# Patient Record
Sex: MALE | Race: WHITE
[De-identification: names, ages, dates, MRNs, and addresses within clinical notes are randomized per-mention and may not be internally consistent; named-entity substitution may affect disease eponyms.]

---

## 2018-02-11 ENCOUNTER — HOSPITAL ENCOUNTER (EMERGENCY)
Dept: HOSPITAL 25 - UCCORT | Age: 63
Discharge: HOME | End: 2018-02-11
Payer: COMMERCIAL

## 2018-02-11 VITALS — SYSTOLIC BLOOD PRESSURE: 119 MMHG | DIASTOLIC BLOOD PRESSURE: 70 MMHG
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
## 2018-02-11 DIAGNOSIS — S20.221A: Primary | ICD-10-CM
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
## 2018-02-11 PROCEDURE — G0463 HOSPITAL OUTPT CLINIC VISIT: HCPCS

## 2018-02-11 PROCEDURE — 99211 OFF/OP EST MAY X REQ PHY/QHP: CPT

## 2018-02-11 NOTE — UC
Truncal Trauma HPI





- HPI Summary


HPI Summary: 


Slipped on ice this morning. Landed on the right side. Pushed in on the area 

and heard a pop with increased pain. C/O possible broken ribs.





- History Of Current Complaint


Chief Complaint: UCBackPain


Stated Complaint: S/P FALL RIGHT LOWER BACK/RIB INJURY


Time Seen by Provider: 02/11/18 20:10


Hx Obtained From: Patient


Onset/Duration: Sudden Onset - this morning, Still Present


Onset Of Pain: Immediate


Severity Initially: Moderate


Severity Currently: Mild


Pain Intensity: 5


Mechanism Of Injury: Blunt Trauma, Fall From A Standing Position


Aggravating Factor(s): Movement, Deep Breathing, Cough


Alleviating factor(s): Ice


Associated Signs And Symptoms: Positive: Chest Pain





- Allergies/Home Medications


Allergies/Adverse Reactions: 


 Allergies











Allergy/AdvReac Type Severity Reaction Status Date / Time


 


No Known Allergies Allergy   Verified 02/11/18 20:04











Home Medications: 


 Home Medications





Ibuprofen 800 mg PO 02/11/18 [History]











PMH/Surg Hx/FS Hx/Imm Hx


Previously Healthy: Yes





- Surgical History


Surgical History: Yes


Surgery Procedure, Year, and Place: bilateral hips resurfaced 2009, femoral 

hernia repair, appy





- Family History


Known Family History: Positive: Other - Dad and brother (57) with prostate ca.


   Negative: Cardiac Disease, Hypertension, Diabetes, Renal Disease





- Social History


Occupation: Employed Full-time


Lives: With Family


Alcohol Use: None


Substance Use Type: None


Smoking Status (MU): Never Smoked Tobacco





Review of Systems


Cardiovascular: Chest Pain - from the trauma


Is Patient Immunocompromised?: No


All Other Systems Reviewed And Are Negative: Yes





Physical Exam


Triage Information Reviewed: Yes


Appearance: Well-Appearing, No Pain Distress, Well-Nourished


Vital Signs: 


 Initial Vital Signs











Temp  97.6 F   02/11/18 19:59


 


Pulse  67   02/11/18 19:59


 


Resp  18   02/11/18 19:59


 


BP  119/70   02/11/18 19:59


 


Pulse Ox  96   02/11/18 19:59











Vital Signs Reviewed: Yes


Eyes: Positive: Conjunctiva Clear


Neck exam: Normal


Respiratory Exam: Normal


Cardiovascular Exam: Normal


Musculoskeletal: Positive: Other: - tenderness posterior chest wall, lower rib 

cage, point tender.


Neurological Exam: Normal


Psychological Exam: Normal


Skin Exam: Normal





Truncal Trauma Course/Dx





- Differential Dx/Diagnosis


Differential Diagnosis/HQI/PQRI: Chest Wall Contusion, Chest Wall Abrasion, Rib 

Fracture


Provider Diagnoses: Contusion posterior thorax





Discharge





- Discharge Plan


Condition: Stable


Disposition: HOME


Patient Education Materials:  Rib Contusion (ED)


Referrals: 


Gallito Mc MD [Primary Care Provider] -

## 2018-02-11 NOTE — RAD
Indication: RIGHT lower posterior rib pain post fall.



Comparison: August 28, 2016 CT abdomen.



Technique: PA chest and 4 view RIGHT rib series.



Report: RIGHT inferolateral skin marker noted indicating the site of clinical concern.

Negative for RIGHT rib fracture, pleural effusion, or pneumothorax. Variant articulation

at the lateral segment of the RIGHT first rib which may represent sequela of a remote

fracture. Clear lungs.



IMPRESSION:  Negative for acute RIGHT rib fracture or pneumothorax.

## 2018-03-21 ENCOUNTER — HOSPITAL ENCOUNTER (EMERGENCY)
Dept: HOSPITAL 25 - ED | Age: 63
Discharge: HOME | End: 2018-03-21
Payer: COMMERCIAL

## 2018-03-21 VITALS — SYSTOLIC BLOOD PRESSURE: 122 MMHG | DIASTOLIC BLOOD PRESSURE: 73 MMHG

## 2018-03-21 DIAGNOSIS — R10.31: ICD-10-CM

## 2018-03-21 DIAGNOSIS — N20.1: Primary | ICD-10-CM

## 2018-03-21 LAB
BASOPHILS # BLD AUTO: 0 10^3/UL (ref 0–0.2)
EOSINOPHIL # BLD AUTO: 0.1 10^3/UL (ref 0–0.6)
HCT VFR BLD AUTO: 42 % (ref 42–52)
HGB BLD-MCNC: 14.3 G/DL (ref 14–18)
LYMPHOCYTES # BLD AUTO: 1.1 10^3/UL (ref 1–4.8)
MCH RBC QN AUTO: 29 PG (ref 27–31)
MCHC RBC AUTO-ENTMCNC: 34 G/DL (ref 31–36)
MCV RBC AUTO: 85 FL (ref 80–94)
MONOCYTES # BLD AUTO: 0.5 10^3/UL (ref 0–0.8)
NEUTROPHILS # BLD AUTO: 6.7 10^3/UL (ref 1.5–7.7)
NRBC # BLD AUTO: 0 10^3/UL
NRBC BLD QL AUTO: 0
PLATELET # BLD AUTO: 182 10^3/UL (ref 150–450)
RBC # BLD AUTO: 4.95 10^6/UL (ref 4–5.4)
WBC # BLD AUTO: 8.4 10^3/UL (ref 3.5–10.8)

## 2018-03-21 PROCEDURE — 96374 THER/PROPH/DIAG INJ IV PUSH: CPT

## 2018-03-21 PROCEDURE — 86140 C-REACTIVE PROTEIN: CPT

## 2018-03-21 PROCEDURE — 96375 TX/PRO/DX INJ NEW DRUG ADDON: CPT

## 2018-03-21 PROCEDURE — 81003 URINALYSIS AUTO W/O SCOPE: CPT

## 2018-03-21 PROCEDURE — 80053 COMPREHEN METABOLIC PANEL: CPT

## 2018-03-21 PROCEDURE — 83735 ASSAY OF MAGNESIUM: CPT

## 2018-03-21 PROCEDURE — 99282 EMERGENCY DEPT VISIT SF MDM: CPT

## 2018-03-21 PROCEDURE — 85025 COMPLETE CBC W/AUTO DIFF WBC: CPT

## 2018-03-21 PROCEDURE — 36415 COLL VENOUS BLD VENIPUNCTURE: CPT

## 2018-03-21 PROCEDURE — 74176 CT ABD & PELVIS W/O CONTRAST: CPT

## 2018-03-21 PROCEDURE — 87086 URINE CULTURE/COLONY COUNT: CPT

## 2018-03-21 PROCEDURE — 81015 MICROSCOPIC EXAM OF URINE: CPT

## 2018-03-21 NOTE — ED
Molly ALMANZAR Thomas, scribed for Donna Remy MD on 03/21/18 at 0626 .





Abdominal Pain/Male





- HPI Summary


HPI Summary: 





The patient is a 63 year old male presenting with sudden-onset RLQ pain that 

began six hours ago. He has been dry heaving. He denies urinary symptoms. He 

has a history of kidney stones. 








- History of Current Complaint


Chief Complaint: EDAbdPain


Stated Complaint: FLANK PAIN, VOMITING


Time Seen by Provider: 03/21/18 05:48


Hx Obtained From: Patient


Onset/Duration: Sudden Onset, Lasting Hours - 6, Still Present


Timing: Constant


Severity Currently: Severe


Pain Intensity: 9


Pain Scale Used: 0-10 Numeric


Location: Discrete At: RLQ


Aggravating Factor(s): Nothing


Alleviating Factor(s): Nothing


Associated Signs And Symptoms: Positive: Nausea, Other - Dry heaves.  Negative: 

Urinary Symptoms





- Allergies/Home Medications


Allergies/Adverse Reactions: 


 Allergies











Allergy/AdvReac Type Severity Reaction Status Date / Time


 


No Known Allergies Allergy   Verified 02/11/18 20:04














PMH/Surg Hx/FS Hx/Imm Hx


 History: Reports: Hx Kidney Stones


Sensory History: 


   Denies: Hx Legally Blind, Hx Deafness


EENT History: 


   Denies: Hx Deafness





- Surgical History


Surgery Procedure, Year, and Place: bilateral hips resurfaced 2009, femoral 

hernia repair, appy





- Immunization History


Date of Influenza Vaccine: has not recieved


Infectious Disease History: No


Infectious Disease History: 


   Denies: Traveled Outside the [place] in Last 30 Days





- Family History


Known Family History: Positive: Other - Dad and brother (57) with prostate ca.


   Negative: Cardiac Disease, Hypertension, Diabetes, Renal Disease





- Social History


Alcohol Use: None


Substance Use Type: Reports: None


Smoking Status (MU): Never Smoked Tobacco





Review of Systems


Negative: Fever


Positive: Abdominal Pain - RLQ, Nausea, Other - Dry heaving


Positive: no symptoms reported


All Other Systems Reviewed And Are Negative: Yes





Physical Exam





- Summary


Physical Exam Summary: 





VITAL SIGNS: Reviewed.


GENERAL: Patient is a well-developed and nourished male who is lying 

comfortable in the stretcher. Patient is not in any acute respiratory distress.


HEAD AND FACE: No signs of trauma. No ecchymosis, hematomas or skull 

depressions. No sinus tenderness.


EYES: PERRLA, EOMI x 2, No injected conjunctiva, no nystagmus.


EARS: Hearing grossly intact. Ear canals and tympanic membranes are within 

normal limits.


MOUTH: Oropharynx within normal limits.


NECK: Supple, trachea is midline, no adenopathy, no JVD, no carotid bruit, no c-

spine tenderness, neck with full ROM.


CHEST: Symmetric, no tenderness at palpation


LUNGS: Clear to auscultation bilaterally. No wheezing or crackles.


CVS: Regular rate and rhythm, S1 and S2 present, no murmurs or gallops 

appreciated.


ABDOMEN: Soft, non-tender. No signs of distention. No rebound no guarding, and 

no masses palpated. Bowel sounds are normal.


BACK: Mild right CVA tenderness. 


EXTREMITIES: FROM in all major joints, no edema, no cyanosis or clubbing.


NEURO: Alert and oriented x 3. No acute neurological deficits. Speech is normal 

and follows commands.


SKIN: Dry and warm


Triage Information Reviewed: Yes


Vital Signs On Initial Exam: 


 Initial Vitals











Temp Pulse Resp BP Pulse Ox


 


 97.1 F   74   24   150/77   97 


 


 03/21/18 05:39  03/21/18 05:39  03/21/18 05:39  03/21/18 05:39  03/21/18 05:39











Vital Signs Reviewed: Yes





Diagnostics





- Vital Signs


 Vital Signs











  Temp Pulse Resp BP Pulse Ox


 


 03/21/18 06:11    16  


 


 03/21/18 05:39  97.1 F  74  24  150/77  97














- Laboratory


Result Diagrams: 


 03/21/18 06:10





Lab Statement: Any lab studies that have been ordered have been reviewed, and 

results considered in the medical decision making process.





- CT


  ** CT Abd/Pel


CT Interpretation: Positive (See Comments) - Minimal right hydroneprhosis and 

proximal through at least mid hydroureter, which could be due to a stone in the 

distal ureter or bladder. Pelvis evaluation markedly limited by metallic streak 

artifact from bilateral hip arthroplasties, but a 3 mm density in expected 

region of the right UVJ on axial image 149 may represent a stone. Unremarkable 

pancreas and gallbladder. No bowel obstruction, colitis, free fluid or free 

air. Appendix not seen with certainty. No pericecal inflammation to suggest 

acute appendicitis. Diverticulosis colon without definite acute diverticulitis. 

Small umbilical and supraumbilical ventral hernias containing fat. Dr. Remy 

has reviewed this report.


CT Interpretation Completed By: Radiologist





Abdominal Pain Fem Course/Dx





- Course


Assessment/Plan: The patient is a 63 year old male presenting with sudden-onset 

RLQ pain that began six hours ago. He has a history of kidney stones. In the ED 

course the patient was given Toradol, Reglan, morphine, and IV fluids. 

Bloodwork is ordered. CT Abd/Pel shows "Minimal right hydroneprhosis and 

proximal through at least mid hydroureter, which could be due to a stone in the 

distal ureter or bladder. Pelvis evaluation markedly limited by metallic streak 

artifact from bilateral hip arthroplasties, but a 3 mm density in expected 

region of the right UVJ on axial image 149 may represent a stone. Unremarkable 

pancreas and gallbladder. No bowel obstruction, colitis, free fluid or free 

air. Appendix not seen with certainty. No pericecal inflammation to suggest 

acute appendicitis. Diverticulosis colon without definite acute diverticulitis. 

Small umbilical and supraumbilical ventral hernias containing fat". The patient 

will be signed out to Dr. Collins to follow up on the urine results and 

disposition.





- Diagnoses


Provider Diagnoses: 


 Right ureteral stone, Renal colic








Discharge





- Sign-Out/Discharge


Documenting (check all that apply): Sign-Out Patient


Signing out patient TO: Keaton Collins





- Discharge Plan


Referrals: 


Gallito Mc MD [Primary Care Provider] - 





The documentation as recorded by the Molly stevens Thomas accurately reflects 

the service I personally performed and the decisions made by me, Donna Remy MD.

## 2018-03-21 NOTE — RAD
Indication: Abdominal pain.



CT of the abdomen and pelvis was performed without oral or IV contrast administration.

Coronal and sagittal reconstructed images were obtained.



Lung bases demonstrate some atelectasis in the lingula. No pleural fluid or nodules are

identified. The heart demonstrates no pericardial effusion.



The liver is normal in size. No focal lesions or intrahepatic ductal dilatation is noted.

The gallbladder demonstrates no calcified gallstones. The common duct is not dilated. The

spleen is normal in size.



No adrenal masses are noted. The kidneys demonstrates right hydronephrosis and

hydroureter. Enlargement of the right kidney is noted with perinephric infiltration of

fat. There may be a calculus at the right ureterovesicular junction although metallic

artifact from bilateral hip replacement obscures exact location. Multiple calcifications

are noted in the prostate.



No retroperitoneal adenopathy is noted. Left kidney is unremarkable.



No dilated loops of bowel are noted.



IMPRESSION: Right hydronephrosis and right hydroureter. Streak artifact from hip

replacement obscures the pelvis there is suggestion of a calculus near the region of the

right ureterovesicular junction measuring approximately 2 to 3 mm. Appendix is not

visualized.

## 2018-03-21 NOTE — ED
Perlita ALMANZAR Julia, scribed for Keaton Collins MD on 03/21/18 at 0716 .





Progress





- Progress Note


Progress Note: 





Patient is signed out from Dr. Remy, awaiting urine analysis results and 

disposition.





Course/Dx





- Course


Course Of Treatment: Mr. Thomas remained stable here in the ED and was 

requesting D/C intermittently.  His U/A showed no evidence for infection and he 

was sent prescriptions for flomax and percocet.





- Diagnoses


Provider Diagnoses: 


 Right ureteral stone








Discharge





- Sign-Out/Discharge


Documenting (check all that apply): Receiving Sign-Out


Receiving patient FROM: Donna Remy





- Discharge Plan


Condition: Stable


Disposition: HOME


Prescriptions: 


oxyCODONE/Acetamin 5/325 MG* [Percocet 5/325 TAB*] 1 tab PO Q6H PRN #20 tab MDD 

4


 PRN Reason: Pain


Patient Education Materials:  Kidney Stones (ED)


Referrals: 


Dick Vazquez MD [Medical Doctor] - As Soon As Possible (Follow up with Dr. Vazquez, or another urologist as soon as possible.)





- Billing Disposition and Condition


Condition: STABLE


Disposition: HOME





The documentation as recorded by the Perlita stevens Julia accurately reflects 

the service I personally performed and the decisions made by me, Keaton Collins MD.